# Patient Record
Sex: FEMALE | Race: ASIAN | Employment: UNEMPLOYED | URBAN - METROPOLITAN AREA
[De-identification: names, ages, dates, MRNs, and addresses within clinical notes are randomized per-mention and may not be internally consistent; named-entity substitution may affect disease eponyms.]

---

## 2019-01-24 ENCOUNTER — TELEPHONE (OUTPATIENT)
Dept: OBGYN CLINIC | Facility: CLINIC | Age: 39
End: 2019-01-24

## 2019-01-24 NOTE — TELEPHONE ENCOUNTER
Pt calling to report she has been on her period for 11 days and wants to come in to see the doctor. Pt stated she had some spotting on and off for a few days on 12/7 but was never a regular period flow.  Pt stated she started bleeding again on 1/14 and has

## 2019-01-25 ENCOUNTER — OFFICE VISIT (OUTPATIENT)
Dept: OBGYN CLINIC | Facility: CLINIC | Age: 39
End: 2019-01-25

## 2019-01-25 VITALS
SYSTOLIC BLOOD PRESSURE: 126 MMHG | HEART RATE: 73 BPM | DIASTOLIC BLOOD PRESSURE: 83 MMHG | BODY MASS INDEX: 29 KG/M2 | WEIGHT: 156 LBS

## 2019-01-25 DIAGNOSIS — N92.1 MENORRHAGIA WITH IRREGULAR CYCLE: Primary | ICD-10-CM

## 2019-01-25 PROCEDURE — 99213 OFFICE O/P EST LOW 20 MIN: CPT | Performed by: OBSTETRICS & GYNECOLOGY

## 2019-01-25 RX ORDER — MEDROXYPROGESTERONE ACETATE 10 MG/1
20 TABLET ORAL DAILY
Qty: 20 TABLET | Refills: 1 | Status: SHIPPED | OUTPATIENT
Start: 2019-01-25 | End: 2019-02-04

## 2019-01-25 NOTE — PROGRESS NOTES
HPI:    Patient ID: Dain Chan is a 34year old female. HPI   with normally monthly menses and rhythm for [facility]. Brenda Snowball is now 2.5 and Yony Oleary still breast feeds her 1-2 x / day. Yony Oleary started menses 3 months PP.  She would have expected mense Assay, Thyroid Stim Hormone      Meds This Visit:  Requested Prescriptions     Signed Prescriptions Disp Refills   • MedroxyPROGESTERone Acetate (PROVERA) 10 MG Oral Tab 20 tablet 1     Sig: Take 2 tablets (20 mg total) by mouth daily for 10 days.        Im

## 2019-02-03 ENCOUNTER — LAB ENCOUNTER (OUTPATIENT)
Dept: LAB | Facility: HOSPITAL | Age: 39
End: 2019-02-03
Attending: OBSTETRICS & GYNECOLOGY
Payer: COMMERCIAL

## 2019-02-03 DIAGNOSIS — N92.1 MENORRHAGIA WITH IRREGULAR CYCLE: ICD-10-CM

## 2019-02-03 LAB
DEPRECATED RDW RBC AUTO: 40.2 FL (ref 35.1–46.3)
ERYTHROCYTE [DISTWIDTH] IN BLOOD BY AUTOMATED COUNT: 13.2 % (ref 11–15)
HCT VFR BLD AUTO: 33.3 % (ref 35–48)
HGB BLD-MCNC: 11.4 G/DL (ref 12–16)
MCH RBC QN AUTO: 29.1 PG (ref 26–34)
MCHC RBC AUTO-ENTMCNC: 34.2 G/DL (ref 31–37)
MCV RBC AUTO: 84.9 FL (ref 80–100)
PLATELET # BLD AUTO: 405 10(3)UL (ref 150–450)
RBC # BLD AUTO: 3.92 X10(6)UL (ref 3.8–5.3)
TSH SERPL-ACNC: 2.12 UIU/ML (ref 0.45–5.33)
WBC # BLD AUTO: 8.3 X10(3) UL (ref 4–11)

## 2019-02-03 PROCEDURE — 36415 COLL VENOUS BLD VENIPUNCTURE: CPT

## 2019-02-03 PROCEDURE — 85027 COMPLETE CBC AUTOMATED: CPT

## 2019-02-03 PROCEDURE — 84443 ASSAY THYROID STIM HORMONE: CPT

## 2019-02-04 ENCOUNTER — TELEPHONE (OUTPATIENT)
Dept: OBGYN CLINIC | Facility: CLINIC | Age: 39
End: 2019-02-04

## 2019-02-04 NOTE — TELEPHONE ENCOUNTER
Pt informed of Susan recs and verbalized understanding. Pt transferred to CS to set up appt for pelvic US.

## 2019-02-04 NOTE — TELEPHONE ENCOUNTER
----- Message from Lizet Kauffman DO sent at 2/4/2019  3:01 PM CST -----  Please let [employer] know the thyroid function is normal and the CBC shows only mild anemia. We'll await US results.

## 2019-02-07 ENCOUNTER — HOSPITAL ENCOUNTER (OUTPATIENT)
Dept: ULTRASOUND IMAGING | Facility: HOSPITAL | Age: 39
Discharge: HOME | End: 2019-02-07
Attending: OBSTETRICS & GYNECOLOGY
Payer: COMMERCIAL

## 2019-02-07 DIAGNOSIS — N92.1 MENORRHAGIA WITH IRREGULAR CYCLE: ICD-10-CM

## 2019-02-07 PROCEDURE — 76856 US EXAM PELVIC COMPLETE: CPT | Performed by: OBSTETRICS & GYNECOLOGY

## 2019-02-07 PROCEDURE — 76830 TRANSVAGINAL US NON-OB: CPT | Performed by: OBSTETRICS & GYNECOLOGY

## 2019-02-14 ENCOUNTER — TELEPHONE (OUTPATIENT)
Dept: OBGYN CLINIC | Facility: CLINIC | Age: 39
End: 2019-02-14

## 2019-04-11 ENCOUNTER — LAB ENCOUNTER (OUTPATIENT)
Dept: LAB | Facility: HOSPITAL | Age: 39
End: 2019-04-11
Attending: FAMILY MEDICINE
Payer: COMMERCIAL

## 2019-04-11 DIAGNOSIS — Z00.00 ROUTINE GENERAL MEDICAL EXAMINATION AT A HEALTH CARE FACILITY: Primary | ICD-10-CM

## 2019-04-11 PROCEDURE — 80061 LIPID PANEL: CPT

## 2019-04-11 PROCEDURE — 36415 COLL VENOUS BLD VENIPUNCTURE: CPT

## 2019-04-11 PROCEDURE — 80053 COMPREHEN METABOLIC PANEL: CPT

## 2019-04-25 ENCOUNTER — OFFICE VISIT (OUTPATIENT)
Dept: OBGYN CLINIC | Facility: CLINIC | Age: 39
End: 2019-04-25

## 2019-04-25 VITALS
WEIGHT: 153.19 LBS | HEART RATE: 80 BPM | BODY MASS INDEX: 28 KG/M2 | DIASTOLIC BLOOD PRESSURE: 80 MMHG | SYSTOLIC BLOOD PRESSURE: 138 MMHG

## 2019-04-25 DIAGNOSIS — Z32.00 PREGNANCY EXAMINATION OR TEST, PREGNANCY UNCONFIRMED: ICD-10-CM

## 2019-04-25 DIAGNOSIS — N92.1 PROLONGED MENSTRUATION: Primary | ICD-10-CM

## 2019-04-25 PROCEDURE — 81025 URINE PREGNANCY TEST: CPT | Performed by: OBSTETRICS & GYNECOLOGY

## 2019-04-25 PROCEDURE — 99214 OFFICE O/P EST MOD 30 MIN: CPT | Performed by: OBSTETRICS & GYNECOLOGY

## 2019-05-06 NOTE — H&P
HPI:  The patient is a 35 yo sexually active F using withdrawal for contraception presents c/o prolonged menstruation. Presents w/ her  and daughter today. Patient saw BRIANNA in January for irregular menses.   Had pelvic US done on 2/8/19 that showed n on file    Relationships      Social connections:        Talks on phone: Not on file        Gets together: Not on file        Attends [employer] service: Not on file        Active member of club or organization: Not on file        Attends meetings of clubs tenderness  Adnexa: normal without masses or tenderness  Perineum: normal    Assessment/Plan:    [employer] was seen today for gyn problem.     Diagnoses and all orders for this visit:    Prolonged menstruation    Pregnancy examination or test, pregnancy unconf

## 2019-05-21 RX ORDER — MEDROXYPROGESTERONE ACETATE 10 MG/1
TABLET ORAL
Qty: 20 TABLET | OUTPATIENT
Start: 2019-05-21

## 2020-03-12 ENCOUNTER — TELEPHONE (OUTPATIENT)
Dept: OBGYN CLINIC | Facility: CLINIC | Age: 40
End: 2020-03-12

## 2020-03-12 ENCOUNTER — OFFICE VISIT (OUTPATIENT)
Dept: OBGYN CLINIC | Facility: CLINIC | Age: 40
End: 2020-03-12

## 2020-03-12 VITALS
BODY MASS INDEX: 23 KG/M2 | WEIGHT: 128.19 LBS | DIASTOLIC BLOOD PRESSURE: 76 MMHG | SYSTOLIC BLOOD PRESSURE: 127 MMHG | HEART RATE: 74 BPM

## 2020-03-12 DIAGNOSIS — N92.1 EXCESSIVE AND FREQUENT MENSTRUATION WITH IRREGULAR CYCLE: Primary | ICD-10-CM

## 2020-03-12 DIAGNOSIS — D25.1 INTRAMURAL UTERINE FIBROID: ICD-10-CM

## 2020-03-12 DIAGNOSIS — N84.0 ENDOMETRIAL POLYP: ICD-10-CM

## 2020-03-12 PROCEDURE — 99213 OFFICE O/P EST LOW 20 MIN: CPT | Performed by: OBSTETRICS & GYNECOLOGY

## 2020-03-12 RX ORDER — MEDROXYPROGESTERONE ACETATE 10 MG/1
20 TABLET ORAL DAILY
Qty: 20 TABLET | Refills: 1 | Status: SHIPPED | OUTPATIENT
Start: 2020-03-12

## 2020-03-12 NOTE — TELEPHONE ENCOUNTER
Our  at [address] [place], [place] [address]way [address] has gone for the day. Can someone from main office send e-mail to [place] with info on My Chart sign-up? E-mail is:    [email or web address]

## 2020-03-12 NOTE — PROGRESS NOTES
HPI:    Patient ID: Douglas Fernandez is a 37year old female. Kike Calvillo and Rae Wooten at visit. HPI  with normally regular monthly menses / 5-6d / 3-4 heavy days. Rhythm for [facility].  She was doing well until late menses starting  and bleeding da for polypectomy followed by IUD but we want repeat pelvic US. I asked her to call or sign up for My Chart and e-mail us by 03-17 if bleeding not improved.  personnel are gone for day and we'll send a code to them at [email or web address]  No order

## 2020-06-16 ENCOUNTER — TELEPHONE (OUTPATIENT)
Dept: OBGYN CLINIC | Facility: CLINIC | Age: 40
End: 2020-06-16

## 2023-11-15 ENCOUNTER — OFFICE VISIT (OUTPATIENT)
Dept: OBGYN CLINIC | Facility: CLINIC | Age: 43
End: 2023-11-15

## 2023-11-15 VITALS
HEART RATE: 66 BPM | BODY MASS INDEX: 25 KG/M2 | DIASTOLIC BLOOD PRESSURE: 83 MMHG | SYSTOLIC BLOOD PRESSURE: 133 MMHG | WEIGHT: 135.19 LBS

## 2023-11-15 DIAGNOSIS — D25.1 INTRAMURAL UTERINE FIBROID: ICD-10-CM

## 2023-11-15 DIAGNOSIS — N84.0 ENDOMETRIAL POLYP: ICD-10-CM

## 2023-11-15 DIAGNOSIS — N92.1 MENORRHAGIA WITH IRREGULAR CYCLE: Primary | ICD-10-CM

## 2023-11-15 PROCEDURE — 99203 OFFICE O/P NEW LOW 30 MIN: CPT | Performed by: OBSTETRICS & GYNECOLOGY

## 2023-11-15 PROCEDURE — 3079F DIAST BP 80-89 MM HG: CPT | Performed by: OBSTETRICS & GYNECOLOGY

## 2023-11-15 PROCEDURE — 3075F SYST BP GE 130 - 139MM HG: CPT | Performed by: OBSTETRICS & GYNECOLOGY

## 2023-12-04 PROBLEM — N92.1 MENORRHAGIA WITH IRREGULAR CYCLE: Status: ACTIVE | Noted: 2023-12-04

## 2023-12-05 NOTE — PROGRESS NOTES
Subjective:   Patient ID: Dewey Nelson is a 37year old female. HPI  [id number] with menses normally monthly and manageable but they traveled to [place] from [place] to July. Since then menses lasting 15d in July and August. Sh started early in September and then bled twice in October. History/Other:   Review of Systems   Constitutional:  Negative for appetite change, fatigue and unexpected weight change. Eyes:  Negative for visual disturbance. Respiratory:  Negative for cough and shortness of breath. Cardiovascular:  Negative for chest pain, palpitations and leg swelling. Gastrointestinal:  Negative for abdominal distention, abdominal pain, blood in stool, constipation and diarrhea. Genitourinary:  Positive for menstrual problem. Negative for dyspareunia, dysuria, frequency, pelvic pain and urgency. Musculoskeletal:  Negative for arthralgias and myalgias. Skin:  Negative for rash. Neurological:  Negative for weakness, numbness and headaches. Psychiatric/Behavioral:  Negative for dysphoric mood. The patient is not nervous/anxious. Current Outpatient Medications   Medication Sig Dispense Refill    medroxyPROGESTERone Acetate (PROVERA) 10 MG Oral Tab Take 2 tablets (20 mg total) by mouth daily. (Patient not taking: Reported on 11/15/2023) 20 tablet 1     Allergies:No Known Allergies    Objective:   Physical Exam  Genitourinary:     Comments: EG, vagina and cervix w/o lesions. Uterus NSSC and no adnexal mass / tenderness. Assessment & Plan:   1. Menorrhagia with irregular cycle    2. Intramural uterine fibroid    3. Endometrial polyp        We'll begin with repeat US to assess endometrium and myometrium. Further recs to follow.      Meds This Visit:  Requested Prescriptions      No prescriptions requested or ordered in this encounter       Imaging & Referrals:  US PELVIS W EV (HFM=[id number]) no

## 2023-12-26 ENCOUNTER — HOSPITAL ENCOUNTER (OUTPATIENT)
Dept: ULTRASOUND IMAGING | Facility: HOSPITAL | Age: 43
Discharge: HOME | End: 2023-12-26
Attending: OBSTETRICS & GYNECOLOGY
Payer: COMMERCIAL

## 2023-12-26 DIAGNOSIS — N92.1 MENORRHAGIA WITH IRREGULAR CYCLE: ICD-10-CM

## 2023-12-26 PROCEDURE — 76830 TRANSVAGINAL US NON-OB: CPT | Performed by: OBSTETRICS & GYNECOLOGY

## 2023-12-26 PROCEDURE — 76856 US EXAM PELVIC COMPLETE: CPT | Performed by: OBSTETRICS & GYNECOLOGY

## 2023-12-28 ENCOUNTER — TELEPHONE (OUTPATIENT)
Dept: OBGYN CLINIC | Facility: CLINIC | Age: 43
End: 2023-12-28

## 2023-12-28 NOTE — TELEPHONE ENCOUNTER
Patient's  called on her behalf wanting to know the results of her recent ultrasound and schedule any appropriate appointment. Please call the patient.

## 2023-12-29 NOTE — TELEPHONE ENCOUNTER
Pt called and informed that BRIANNA needs to review US and give recs. Pt informed will route message, that d/t the holiday she may not hear back until next wk. Pt states understanding.     To BRIANNA to review and advise. Thank you.

## 2024-03-04 ENCOUNTER — TELEPHONE (OUTPATIENT)
Dept: OBGYN CLINIC | Facility: CLINIC | Age: 44
End: 2024-03-04

## 2024-09-25 ENCOUNTER — OFFICE VISIT (OUTPATIENT)
Dept: OBGYN CLINIC | Facility: CLINIC | Age: 44
End: 2024-09-25

## 2024-09-25 VITALS
HEART RATE: 77 BPM | DIASTOLIC BLOOD PRESSURE: 83 MMHG | BODY MASS INDEX: 26 KG/M2 | SYSTOLIC BLOOD PRESSURE: 134 MMHG | WEIGHT: 144 LBS

## 2024-09-25 DIAGNOSIS — N92.1 MENORRHAGIA WITH IRREGULAR CYCLE: Primary | ICD-10-CM

## 2024-09-25 DIAGNOSIS — Z12.31 SCREENING MAMMOGRAM FOR BREAST CANCER: ICD-10-CM

## 2024-09-25 PROCEDURE — 99213 OFFICE O/P EST LOW 20 MIN: CPT | Performed by: OBSTETRICS & GYNECOLOGY

## 2024-10-01 PROBLEM — N84.0 ENDOMETRIAL POLYP: Status: RESOLVED | Noted: 2020-03-12 | Resolved: 2024-10-01

## 2024-10-01 PROBLEM — N92.1 EXCESSIVE AND FREQUENT MENSTRUATION WITH IRREGULAR CYCLE: Status: RESOLVED | Noted: 2020-03-12 | Resolved: 2024-10-01

## 2024-10-02 NOTE — PROGRESS NOTES
Subjective:   Patient ID: Norma Greer is a 44 year old female. Jose Luis and Elan at visit.     HPI   here in follow up to abnormal bleeding and ultrasound. I saw her in November for irregular and heavy bleeding after trip to [facility] earlier in . Short interval encompassed longer flow in July / August and then short interval in October. Ultrasound 23 was normal and interestingly did not show a previously identified 22 mm fundal fibroid. Since then menses are now q 2 months / 7d and normal flow.     History/Other:   Review of Systems   Constitutional:  Negative for appetite change, fatigue and unexpected weight change.   Eyes:  Negative for visual disturbance.   Respiratory:  Negative for cough and shortness of breath.    Cardiovascular:  Negative for chest pain, palpitations and leg swelling.   Gastrointestinal:  Negative for abdominal distention, abdominal pain, blood in stool, constipation and diarrhea.   Genitourinary:  Negative for dyspareunia, dysuria, frequency, menstrual problem, pelvic pain and urgency.   Musculoskeletal:  Negative for arthralgias and myalgias.   Skin:  Negative for rash.   Neurological:  Negative for weakness, numbness and headaches.   Psychiatric/Behavioral:  Negative for dysphoric mood. The patient is not nervous/anxious.      Current Outpatient Medications   Medication Sig Dispense Refill    medroxyPROGESTERone Acetate (PROVERA) 10 MG Oral Tab Take 2 tablets (20 mg total) by mouth daily. (Patient not taking: Reported on 11/15/2023) 20 tablet 1     Allergies:No Known Allergies    Objective:   Physical Exam  Constitutional:       General: She is not in acute distress.     Appearance: Normal appearance. She is normal weight. She is not ill-appearing.   Neurological:      Mental Status: She is alert.         Assessment & Plan:   1. Menorrhagia with irregular cycle    2. Screening mammogram for breast cancer        No orders of the defined types were placed in this  encounter.  Since bleeding has actually improved, guidance given and she is overdue for annual exam / co-test. She'll schedule within 3-4 months. Screen mammogram order and I'll examine at annual.     Meds This Visit:  Requested Prescriptions      No prescriptions requested or ordered in this encounter       Imaging & Referrals:  [facility] MIRANDA 2D+3D SCREENING BILAT (CPT=[id number])

## 2024-11-27 ENCOUNTER — LAB ENCOUNTER (OUTPATIENT)
Dept: LAB | Facility: HOSPITAL | Age: 44
End: 2024-11-27
Attending: INTERNAL MEDICINE
Payer: COMMERCIAL

## 2024-11-27 DIAGNOSIS — Z00.00 ROUTINE GENERAL MEDICAL EXAMINATION AT A HEALTH CARE FACILITY: Primary | ICD-10-CM

## 2024-11-27 DIAGNOSIS — E78.00 PURE HYPERCHOLESTEROLEMIA: ICD-10-CM

## 2024-11-27 LAB
ALBUMIN SERPL-MCNC: 4.9 G/DL (ref 3.2–4.8)
ALBUMIN/GLOB SERPL: 1.8 {RATIO} (ref 1–2)
ALP LIVER SERPL-CCNC: 56 U/L
ALT SERPL-CCNC: 9 U/L
ANION GAP SERPL CALC-SCNC: 9 MMOL/L (ref 0–18)
AST SERPL-CCNC: 14 U/L (ref ?–34)
BASOPHILS # BLD AUTO: 0.02 X10(3) UL (ref 0–0.2)
BASOPHILS NFR BLD AUTO: 0.3 %
BILIRUB SERPL-MCNC: 0.5 MG/DL (ref 0.3–1.2)
BUN BLD-MCNC: 12 MG/DL (ref 9–23)
BUN/CREAT SERPL: 15.2 (ref 10–20)
CALCIUM BLD-MCNC: 10.1 MG/DL (ref 8.7–10.4)
CHLORIDE SERPL-SCNC: 104 MMOL/L (ref 98–112)
CHOLEST SERPL-MCNC: 207 MG/DL (ref ?–200)
CO2 SERPL-SCNC: 26 MMOL/L (ref 21–32)
CREAT BLD-MCNC: 0.79 MG/DL
DEPRECATED RDW RBC AUTO: 37.5 FL (ref 35.1–46.3)
EGFRCR SERPLBLD CKD-EPI 2021: 93 ML/MIN/1.73M2 (ref 60–?)
EOSINOPHIL # BLD AUTO: 0.12 X10(3) UL (ref 0–0.7)
EOSINOPHIL NFR BLD AUTO: 1.9 %
ERYTHROCYTE [DISTWIDTH] IN BLOOD BY AUTOMATED COUNT: 12.2 % (ref 11–15)
FASTING PATIENT LIPID ANSWER: YES
FASTING STATUS PATIENT QL REPORTED: YES
GLOBULIN PLAS-MCNC: 2.8 G/DL (ref 2–3.5)
GLUCOSE BLD-MCNC: 84 MG/DL (ref 70–99)
HCT VFR BLD AUTO: 43.4 %
HDLC SERPL-MCNC: 63 MG/DL (ref 40–59)
HGB BLD-MCNC: 14.6 G/DL
IMM GRANULOCYTES # BLD AUTO: 0.01 X10(3) UL (ref 0–1)
IMM GRANULOCYTES NFR BLD: 0.2 %
LDLC SERPL CALC-MCNC: 115 MG/DL (ref ?–100)
LYMPHOCYTES # BLD AUTO: 1.87 X10(3) UL (ref 1–4)
LYMPHOCYTES NFR BLD AUTO: 29.9 %
MCH RBC QN AUTO: 28.6 PG (ref 26–34)
MCHC RBC AUTO-ENTMCNC: 33.6 G/DL (ref 31–37)
MCV RBC AUTO: 84.9 FL
MONOCYTES # BLD AUTO: 0.36 X10(3) UL (ref 0.1–1)
MONOCYTES NFR BLD AUTO: 5.8 %
NEUTROPHILS # BLD AUTO: 3.87 X10 (3) UL (ref 1.5–7.7)
NEUTROPHILS # BLD AUTO: 3.87 X10(3) UL (ref 1.5–7.7)
NEUTROPHILS NFR BLD AUTO: 61.9 %
NONHDLC SERPL-MCNC: 144 MG/DL (ref ?–130)
OSMOLALITY SERPL CALC.SUM OF ELEC: 287 MOSM/KG (ref 275–295)
PLATELET # BLD AUTO: 356 10(3)UL (ref 150–450)
POTASSIUM SERPL-SCNC: 4 MMOL/L (ref 3.5–5.1)
PROT SERPL-MCNC: 7.7 G/DL (ref 5.7–8.2)
RBC # BLD AUTO: 5.11 X10(6)UL
SODIUM SERPL-SCNC: 139 MMOL/L (ref 136–145)
TRIGL SERPL-MCNC: 169 MG/DL (ref 30–149)
TSI SER-ACNC: 2.31 UIU/ML (ref 0.55–4.78)
VLDLC SERPL CALC-MCNC: 29 MG/DL (ref 0–30)
WBC # BLD AUTO: 6.3 X10(3) UL (ref 4–11)

## 2024-11-27 PROCEDURE — 85025 COMPLETE CBC W/AUTO DIFF WBC: CPT

## 2024-11-27 PROCEDURE — 36415 COLL VENOUS BLD VENIPUNCTURE: CPT

## 2024-11-27 PROCEDURE — 80061 LIPID PANEL: CPT

## 2024-11-27 PROCEDURE — 80053 COMPREHEN METABOLIC PANEL: CPT

## 2024-11-27 PROCEDURE — 84443 ASSAY THYROID STIM HORMONE: CPT
